# Patient Record
Sex: FEMALE | Race: WHITE | URBAN - METROPOLITAN AREA
[De-identification: names, ages, dates, MRNs, and addresses within clinical notes are randomized per-mention and may not be internally consistent; named-entity substitution may affect disease eponyms.]

---

## 2018-03-05 ENCOUNTER — OFFICE VISIT (OUTPATIENT)
Dept: FAMILY MEDICINE CLINIC | Facility: CLINIC | Age: 23
End: 2018-03-05

## 2018-03-05 VITALS
SYSTOLIC BLOOD PRESSURE: 110 MMHG | HEIGHT: 65 IN | RESPIRATION RATE: 16 BRPM | DIASTOLIC BLOOD PRESSURE: 80 MMHG | HEART RATE: 88 BPM | BODY MASS INDEX: 22.76 KG/M2 | WEIGHT: 136.63 LBS | TEMPERATURE: 98 F

## 2018-03-05 DIAGNOSIS — J06.9 UPPER RESPIRATORY TRACT INFECTION, UNSPECIFIED TYPE: ICD-10-CM

## 2018-03-05 DIAGNOSIS — J02.9 SORE THROAT: Primary | ICD-10-CM

## 2018-03-05 LAB — CONTROL LINE PRESENT WITH A CLEAR BACKGROUND (YES/NO): YES YES/NO

## 2018-03-05 PROCEDURE — 87880 STREP A ASSAY W/OPTIC: CPT | Performed by: FAMILY MEDICINE

## 2018-03-05 PROCEDURE — 99213 OFFICE O/P EST LOW 20 MIN: CPT | Performed by: FAMILY MEDICINE

## 2018-03-05 PROCEDURE — 87081 CULTURE SCREEN ONLY: CPT | Performed by: FAMILY MEDICINE

## 2018-03-05 RX ORDER — NORETHINDRONE ACETATE AND ETHINYL ESTRADIOL AND FERROUS FUMARATE 1MG-20(21)
KIT ORAL
Refills: 5 | COMMUNITY
Start: 2018-01-27

## 2018-03-05 NOTE — PATIENT INSTRUCTIONS
Adequate rest and hydration, warm facial packs, and steam inhalation. May use topical vicks, nasal saline spray as needed. Likely viral infection. Return to clinic in 1-2 weeks if no improvement. Sooner if symptoms gets worse.

## 2018-03-06 NOTE — PROGRESS NOTES
[address]  PROGRESS NOTE  Chief Complaint:   Patient presents with:  Sore Throat: sore throat, ear ache, pressure behind eyes, tylenol, ibuprofen.        HPI:   This is a 25year old female presents with mom complaining of patient having stiffness. LYMPHATICS:  Denies enlarged nodes  ENDOCRINOLOGIC:  Denies excessive sweating, cold or heat intolerance, polyuria or polydipsia. ALLERGIES:  Denies allergic response, history of asthma, sneezing, hives, eczema or rhinitis.      EXAM:   / Instructions   Adequate rest and hydration, warm facial packs, and steam inhalation. May use topical vicks, nasal saline spray as needed. Likely viral infection. Return to clinic in 1-2 weeks if no improvement. Sooner if symptoms gets worse.         He

## 2018-03-09 ENCOUNTER — TELEPHONE (OUTPATIENT)
Dept: FAMILY MEDICINE CLINIC | Facility: CLINIC | Age: 23
End: 2018-03-09

## 2018-03-09 NOTE — TELEPHONE ENCOUNTER
----- Message from Kathleen Gruber MD sent at 3/9/2018  8:22 AM CST -----  Please inform patient that strep culture is negative.   Advised to return to clinic if no improvement in symptoms

## 2024-03-15 ENCOUNTER — TELEPHONE (OUTPATIENT)
Dept: OBGYN | Age: 29
End: 2024-03-15

## 2024-04-02 ENCOUNTER — APPOINTMENT (OUTPATIENT)
Dept: OBGYN | Age: 29
End: 2024-04-02

## (undated) NOTE — LETTER
[address], [address] [place] [address]            March 12, 2018      [address] [facility] [address] [facility] [id number]      Dear Claudeen Leeds:    Our office has been trying to con